# Patient Record
Sex: FEMALE | Race: OTHER | HISPANIC OR LATINO
[De-identification: names, ages, dates, MRNs, and addresses within clinical notes are randomized per-mention and may not be internally consistent; named-entity substitution may affect disease eponyms.]

---

## 2023-09-21 ENCOUNTER — APPOINTMENT (OUTPATIENT)
Dept: OTOLARYNGOLOGY | Facility: CLINIC | Age: 53
End: 2023-09-21
Payer: MEDICAID

## 2023-09-21 VITALS — BODY MASS INDEX: 26.84 KG/M2 | HEIGHT: 66 IN | WEIGHT: 167 LBS

## 2023-09-21 DIAGNOSIS — Z80.9 FAMILY HISTORY OF MALIGNANT NEOPLASM, UNSPECIFIED: ICD-10-CM

## 2023-09-21 DIAGNOSIS — Z86.39 PERSONAL HISTORY OF OTHER ENDOCRINE, NUTRITIONAL AND METABOLIC DISEASE: ICD-10-CM

## 2023-09-21 DIAGNOSIS — Z78.9 OTHER SPECIFIED HEALTH STATUS: ICD-10-CM

## 2023-09-21 DIAGNOSIS — Z82.3 FAMILY HISTORY OF STROKE: ICD-10-CM

## 2023-09-21 DIAGNOSIS — K21.9 GASTRO-ESOPHAGEAL REFLUX DISEASE W/OUT ESOPHAGITIS: ICD-10-CM

## 2023-09-21 PROBLEM — Z00.00 ENCOUNTER FOR PREVENTIVE HEALTH EXAMINATION: Status: ACTIVE | Noted: 2023-09-21

## 2023-09-21 PROCEDURE — 99203 OFFICE O/P NEW LOW 30 MIN: CPT | Mod: 25

## 2023-09-21 PROCEDURE — 31575 DIAGNOSTIC LARYNGOSCOPY: CPT

## 2023-10-12 ENCOUNTER — APPOINTMENT (OUTPATIENT)
Dept: OTOLARYNGOLOGY | Facility: CLINIC | Age: 53
End: 2023-10-12
Payer: MEDICAID

## 2023-10-12 VITALS — BODY MASS INDEX: 26.84 KG/M2 | WEIGHT: 167 LBS | HEIGHT: 66 IN

## 2023-10-12 PROCEDURE — 41110 EXCISION OF TONGUE LESION: CPT

## 2023-10-12 RX ORDER — UBIDECARENONE/VIT E ACET 100MG-5
CAPSULE ORAL
Refills: 0 | Status: ACTIVE | COMMUNITY

## 2023-10-12 RX ORDER — PNV NO.95/FERROUS FUM/FOLIC AC 28MG-0.8MG
TABLET ORAL
Refills: 0 | Status: ACTIVE | COMMUNITY

## 2023-10-12 RX ORDER — ELECTROLYTES/DEXTROSE
SOLUTION, ORAL ORAL
Refills: 0 | Status: ACTIVE | COMMUNITY

## 2023-10-12 RX ORDER — PAROXETINE HYDROCHLORIDE 20 MG/1
20 TABLET, FILM COATED ORAL
Refills: 0 | Status: ACTIVE | COMMUNITY

## 2023-10-12 RX ORDER — IRON POLYSACCHARIDE COMPLEX 150 MG
150 CAPSULE ORAL
Refills: 0 | Status: ACTIVE | COMMUNITY

## 2023-10-12 RX ORDER — QUETIAPINE FUMARATE 25 MG/1
25 TABLET ORAL
Refills: 0 | Status: ACTIVE | COMMUNITY

## 2023-10-17 ENCOUNTER — NON-APPOINTMENT (OUTPATIENT)
Age: 53
End: 2023-10-17

## 2023-10-17 LAB — CORE LAB BIOPSY: NORMAL

## 2023-11-07 ENCOUNTER — APPOINTMENT (OUTPATIENT)
Dept: OTOLARYNGOLOGY | Facility: CLINIC | Age: 53
End: 2023-11-07
Payer: MEDICAID

## 2023-11-07 DIAGNOSIS — K14.8 OTHER DISEASES OF TONGUE: ICD-10-CM

## 2023-11-07 PROCEDURE — 99212 OFFICE O/P EST SF 10 MIN: CPT | Mod: 25

## 2023-12-08 ENCOUNTER — APPOINTMENT (OUTPATIENT)
Dept: OTOLARYNGOLOGY | Facility: CLINIC | Age: 53
End: 2023-12-08
Payer: MEDICAID

## 2023-12-08 PROCEDURE — 99203 OFFICE O/P NEW LOW 30 MIN: CPT

## 2024-01-17 ENCOUNTER — APPOINTMENT (OUTPATIENT)
Dept: OTOLARYNGOLOGY | Facility: CLINIC | Age: 54
End: 2024-01-17

## 2024-01-24 ENCOUNTER — APPOINTMENT (OUTPATIENT)
Dept: OTOLARYNGOLOGY | Facility: CLINIC | Age: 54
End: 2024-01-24
Payer: MEDICAID

## 2024-01-24 VITALS
HEART RATE: 92 BPM | DIASTOLIC BLOOD PRESSURE: 59 MMHG | TEMPERATURE: 98 F | OXYGEN SATURATION: 98 % | BODY MASS INDEX: 28.66 KG/M2 | SYSTOLIC BLOOD PRESSURE: 99 MMHG | WEIGHT: 172 LBS | HEIGHT: 65 IN

## 2024-01-24 PROCEDURE — 11426 EXC H-F-NK-SP B9+MARG >4 CM: CPT

## 2024-01-31 ENCOUNTER — APPOINTMENT (OUTPATIENT)
Dept: OTOLARYNGOLOGY | Facility: CLINIC | Age: 54
End: 2024-01-31
Payer: MEDICAID

## 2024-01-31 VITALS
OXYGEN SATURATION: 74 % | SYSTOLIC BLOOD PRESSURE: 109 MMHG | TEMPERATURE: 98 F | WEIGHT: 172 LBS | HEART RATE: 64 BPM | HEIGHT: 65 IN | BODY MASS INDEX: 28.66 KG/M2 | DIASTOLIC BLOOD PRESSURE: 75 MMHG

## 2024-01-31 LAB — CORE LAB BIOPSY: NORMAL

## 2024-01-31 PROCEDURE — 99024 POSTOP FOLLOW-UP VISIT: CPT

## 2024-01-31 NOTE — ASSESSMENT
[FreeTextEntry1] : .  Plan: - Doing well. Wound care instructions reviewed. - RTC 1 month  -- Donna Goodman MD Facial Plastic & Reconstructive Surgery

## 2024-01-31 NOTE — REASON FOR VISIT
[de-identified] : Right neck lesion excision [de-identified] : 1/24/24 [de-identified] : Pt is doing well since surgery. Pain is well-controlled. No concerns.   Exam: No acute distress Incision well-healing, sutures removed No evidence of infection or dehiscence Mild ecchymosis along superior chest likely related to skin elevation   Path:  Compound melanocytic nevus

## 2024-02-28 ENCOUNTER — APPOINTMENT (OUTPATIENT)
Dept: OTOLARYNGOLOGY | Facility: CLINIC | Age: 54
End: 2024-02-28
Payer: MEDICAID

## 2024-02-28 VITALS
DIASTOLIC BLOOD PRESSURE: 70 MMHG | HEART RATE: 76 BPM | WEIGHT: 172 LBS | TEMPERATURE: 97.6 F | SYSTOLIC BLOOD PRESSURE: 102 MMHG | HEIGHT: 65 IN | OXYGEN SATURATION: 82 % | BODY MASS INDEX: 28.66 KG/M2

## 2024-02-28 DIAGNOSIS — L98.9 DISORDER OF THE SKIN AND SUBCUTANEOUS TISSUE, UNSPECIFIED: ICD-10-CM

## 2024-02-28 PROCEDURE — 99212 OFFICE O/P EST SF 10 MIN: CPT

## 2024-02-28 NOTE — HISTORY OF PRESENT ILLNESS
[de-identified] : Ms. ALKA MARQUES is a pleasant 53 year female who presented for right neck pigmented lesion. She is now s/p in-office excision carried out on  carried out on 1/24/24.    She is doing well since last office visit. No concerns. Pain is well-controlled. No itchiness around the incision. Pathology was reviewed.

## 2024-02-28 NOTE — ASSESSMENT
[FreeTextEntry1] : .  Plan: - Healing well. Recommend Biocorneum or other silicone cream for scar healing. - Pt interested in Juvederm/Botox. Will provide quote and she will schedule accordingly.  -- Donna Goodman MD Facial Plastic & Reconstructive Surgery

## 2024-02-28 NOTE — PHYSICAL EXAM
[de-identified] : well-approximated, well-healing linear incision along right inferior lateral neck w/ some residual erythema- expected. No evidence of dehiscence, widening, or scarring

## 2024-02-29 ENCOUNTER — APPOINTMENT (OUTPATIENT)
Dept: PHYSICAL MEDICINE AND REHAB | Facility: CLINIC | Age: 54
End: 2024-02-29
Payer: MEDICAID

## 2024-02-29 ENCOUNTER — OUTPATIENT (OUTPATIENT)
Dept: OUTPATIENT SERVICES | Facility: HOSPITAL | Age: 54
LOS: 1 days | End: 2024-02-29
Payer: MEDICAID

## 2024-02-29 ENCOUNTER — RESULT REVIEW (OUTPATIENT)
Age: 54
End: 2024-02-29

## 2024-02-29 VITALS
TEMPERATURE: 97.7 F | WEIGHT: 164 LBS | OXYGEN SATURATION: 97 % | HEIGHT: 65.5 IN | BODY MASS INDEX: 27 KG/M2 | SYSTOLIC BLOOD PRESSURE: 116 MMHG | HEART RATE: 78 BPM | DIASTOLIC BLOOD PRESSURE: 80 MMHG

## 2024-02-29 DIAGNOSIS — N64.89 OTHER SPECIFIED DISORDERS OF BREAST: ICD-10-CM

## 2024-02-29 DIAGNOSIS — Z85.9 PERSONAL HISTORY OF MALIGNANT NEOPLASM, UNSPECIFIED: ICD-10-CM

## 2024-02-29 DIAGNOSIS — Z87.39 PERSONAL HISTORY OF OTHER DISEASES OF THE MUSCULOSKELETAL SYSTEM AND CONNECTIVE TISSUE: ICD-10-CM

## 2024-02-29 DIAGNOSIS — S39.012A STRAIN OF MUSCLE, FASCIA AND TENDON OF LOWER BACK, INITIAL ENCOUNTER: ICD-10-CM

## 2024-02-29 DIAGNOSIS — Z56.0 UNEMPLOYMENT, UNSPECIFIED: ICD-10-CM

## 2024-02-29 DIAGNOSIS — M54.6 PAIN IN THORACIC SPINE: ICD-10-CM

## 2024-02-29 DIAGNOSIS — Z78.9 OTHER SPECIFIED HEALTH STATUS: ICD-10-CM

## 2024-02-29 DIAGNOSIS — G89.29 PAIN IN THORACIC SPINE: ICD-10-CM

## 2024-02-29 DIAGNOSIS — Z86.79 PERSONAL HISTORY OF OTHER DISEASES OF THE CIRCULATORY SYSTEM: ICD-10-CM

## 2024-02-29 DIAGNOSIS — Z86.39 PERSONAL HISTORY OF OTHER ENDOCRINE, NUTRITIONAL AND METABOLIC DISEASE: ICD-10-CM

## 2024-02-29 PROCEDURE — 72072 X-RAY EXAM THORAC SPINE 3VWS: CPT

## 2024-02-29 PROCEDURE — 72072 X-RAY EXAM THORAC SPINE 3VWS: CPT | Mod: 26

## 2024-02-29 PROCEDURE — 72052 X-RAY EXAM NECK SPINE 6/>VWS: CPT | Mod: 26

## 2024-02-29 PROCEDURE — 99204 OFFICE O/P NEW MOD 45 MIN: CPT

## 2024-02-29 PROCEDURE — 72052 X-RAY EXAM NECK SPINE 6/>VWS: CPT

## 2024-02-29 PROCEDURE — 72110 X-RAY EXAM L-2 SPINE 4/>VWS: CPT | Mod: 26

## 2024-02-29 PROCEDURE — 72110 X-RAY EXAM L-2 SPINE 4/>VWS: CPT

## 2024-02-29 RX ORDER — GLIPIZIDE 2.5 MG/1
TABLET ORAL
Refills: 0 | Status: ACTIVE | COMMUNITY

## 2024-02-29 RX ORDER — METOPROLOL TARTRATE 75 MG/1
TABLET, FILM COATED ORAL
Refills: 0 | Status: ACTIVE | COMMUNITY

## 2024-02-29 SDOH — ECONOMIC STABILITY - INCOME SECURITY: UNEMPLOYMENT, UNSPECIFIED: Z56.0

## 2024-03-11 NOTE — ASSESSMENT
[FreeTextEntry1] :                                                       Assessment/Plan:   ALKA MARQUES is a 53 year female with one year of persistent neck, back pain here for initial consultation.   Cervical myofascial pain syndrome Cervicalgia Thoracic back pain, B/L Decreased range of motion of cervical spine Scapular dykinesis  Pendulous breasts, B/L  Lumbar strain   - Tiers of treatment and management of above diagnosis(es) were discussed with patient - Optimal diet, weight, sleep, and lifestyle management to minimize stress and maximize well being counseling provided - Imaging reviewed and discussed with patient - Reviewed previous encounter notes from 2/4/2024 Dr. LUIS Leger (Orthopedic Surgery Spine Surgery) - Patient was advised to start a structured, targeted therapy program 2-3x/wk for 8 wks with goal toward HEP - Patient was educated on an appropriate home exercise program, provided with exercise recommendations, all questions answered, written HEP scanned into the chart - Patient may trial acetaminophen 1000mg up to TID PRN moderate to severe pain and to decrease average consumption of NSAIDs - Patient was advised to apply cool compresses or warm heat to affected regions PRN - Radiographs of cervical, thoracic, lumbar region ordered today    - Educated about red flag symptoms including (but not limited to) new, worsened, or persistent: fever greater than 100F, bowel or bladder incontinence, bowel obstipation, inability to void urine, urinary leakage, Severe nausea or vomiting, Worsening numbness, worsening tingling/paresthesias, and/or new or progressive motor weakness; advised to seek immediate medical attention at his nearest Emergency department should they experience any of the above     - Follow up in person in 2 months to assess their progress with PT course, review radiographs if no red flag findings, review pharmacologic treatments, review any interval specialist follow up    I have personally spent a total of at least 45 minutes preparing, reviewing internal and external records, explaining, counseling, providing necessary information via documented paperwork for this encounter, and coordinating care for this patient encounter.   Thank you, Dr(s), for allowing me to participate in the care of your patient. Please do not hesitate to contact me with questions/concerns.   Xavier Allen M.D. Sports and Interventional Spine Department of Physical Medicine and Rehabilitation [facility] Orthopaedic [facility] [address] [place] [address]   Appointments: [phone] Fax: [phone]

## 2024-03-11 NOTE — HISTORY OF PRESENT ILLNESS
[FreeTextEntry1] : Xavier Allen M.D. Sports Medicine and Interventional Spine Department of Physical Medicine and Rehabilitation [facility] Orthopaedic [facility] [address] [place] [address]   [facility] Orthopaedic [place] at [facility] [address] [place] [address]   For [place] Appointments Phone: [phone] Fax: [phone]   ----------------------------------------------------------------------------------------------------------------------------------------   PATIENT: ALKA MARQUES MRN: [id number] DATE OF BIRTH: [birth year]  3 1970 DATE OF SERVICE: 02/29/2024 Feb 29, 2024   Dear Drs.   Thank you for referring ALKA MARQUES to my Sports and Interventional Spine practice and office. Enclosed is a copy of the patient's consultation/progress note, which includes my complete assessment and recent studies completed during the patient's evaluation.   If you have questions or have any patients who require nonsurgical, non-opiate management of any sports, spine, or musculoskeletal conditions, please do not hesitate to contact my , Gem Damon at [phone].   I look forward to taking care of your patients along with you.   Sincerely,   Xavier Allen MD Sports, Interventional Spine, & Regenerative Musculoskeletal Medicine Orthopaedic [place] at [facility]                                                       Initial Consultation: CC: cervical pain, breast reduction   HPI:  This is the first visit to [facility] [place] at [facility] Sports Medicine and Interventional Spine Practice.   ALKA MARQUES presents with the chief complaint as above.   Initial Hx on 02/29/2024 : Presents in person to [facility] patient notes that they lost weight related to their gastric bypass surgery 9/2022 their weight loss affected the structure of their breast tissue and is complaining of severe neck pain and pendulous breast tissue, intermittent bacterial skin infections since the weight loss as well patient notes that they have persistent neck and thoracic back pain intermittent low back pain as well points to the right > left cervical region The patients difficulties began one year ago, without inciting event The pain is graded as mild to moderate The pain is described as variable The pain is intermittent The pain does not radiate The patient feels that the pain is overall persistent Patient denies other recent fall, MVA, injury, trauma, or accident besides presenting history above   Aggravating: ambulation, prolonged sitting, prolonged standing, navigating stairs, getting out of bed, sit to stand transitional movements Alleviating: rest, activity modification, pharmacologic treatments as per intake and as above   Meds: denies regular PO pain medications Therapy Program: no recent structured targeted therapy program HEP: doing HEP regularly Injection Hx: denies locally directed treatment to the area in question Imaging Hx: reviewed   Assoc Sx: Denies numbness, Tingling Denies Focal motor weakness in the upper or lower limbs Denies New or worsened bowel or bladder incontinence Denies Saddle anesthesia Denies Using Orthotic(s)/Supportive devices Denies Swelling in the upper/lower extremities They also deny frequent tripping, falling   ROS: A 14 point review of systems was completed. Positive findings are pain as described above. The remaining systems negative.   Breast Cancer Surveillance: up to date COVID HX: reviewed   Assoc Hx: Ambulates without assistive device Level of functioning: indep with ambulation, indep with ADLs Living Situation: dwelling with steps to enter

## 2024-03-11 NOTE — PHYSICAL EXAM
[FreeTextEntry1] : Gen: A+O x 3 in NAD Psych: Normal mood and affect. Responds appropriately to commands Eyes: Anicteric. No discharge. EOMI. Resp: Breathing unlabored CV: radial pulses 2+ and equal. No varicosities noted Ext: No c/c/e Skin: No lesions noted   Gait: Non antalgic +  reciprocating heel to toe able to stand on toes and heels WITH hand holding/holding onto counter with both hands Tandem gait intact WITH hand holding Poor single leg standing balance Able to stand on either lower limb without LOB for 15 seconds Romberg negative   CN2-12 grossly intact  Inspection: Spine alignment is midline, with no evidence of scoliosis. Iliac crest heights and PSIS heights level.  + Forward head position.   Palpation: There is + tenderness over the upper traps, middle traps, levator scaps, rhomboids, articular pillars, cervical paraspinals, B/L   Cervical ROM: Flexion, extension, side-bending, rotation, limited due to pain with most pain at terminal ROM Finger to nose bilaterally intact    C5 (Shoulder Abduction)        C5 (Elbow Flex)         C6 (Wrist Ext)  Right 5/5                                 5/5                           5/5       Left 5/5                                 5/5                           5/5             C7 (Elbow Ext)            C7 (Wrist Flex)             C8 (Long Finger Flex)          T1 (Finger Abduct)          Right 5/5                     5/5                                      5/5                                       5/5                                                 Left 5/5                     5/5                                      5/5                                       5/5                                  C8 (Thumb Ext)            C8 & T1 (Thumb Opp)           Right 5/5                            5/5                                                 Left 5/5                            5/5                                Tone: Normal. No cog wheeling. Proprioception at DIPs intact B/L Sensation: Grossly intact to light touch and pinprick bilateral upper extremities. Reflexes: 2+ symmetric biceps, pronators, brachioradialis, triceps Hoffmans Sign negative Spurling's Sign negative Shoulder Abduction Test (Bakody's) absent Lhermittes Sign negative  RIGHT SHOULDER: neg effusion neg scars or lacerations or lesions neg increased warmth neg scapular winging + scapular dyskinesis (Type 2) neg muscle atrophy   Palpation: no TTP over sterna-clavicular joint no TTP over clavicle no TTP over coracoid process no TTP over sternum no TTP over AC joint no TTP over humerus +TTP over the spine of the scapula + TTP over the medial border of the scapula, superior angle, inferior angle, lateral border no TTP over supraspinatus +TTP over infraspinatus no TTP over upper trapezius no TTP over deltoid muscle no TTP in the axilla neg crepitus with PROM shoulder   AROM: Active range of motion was near functional limits but with pain at terminal ROM in most planes PROM consistent with above   Assessment of access (for OVERHEAD/INCLINE PRESS) on the right demonstrates inability to forward flex, externally rotate shoulder beyond 130 degrees without compensatory: + thoracic extension + thoracic rotation of the contralateral side towards the ipsilateral shoulder + postural kyphosis     neg sulcus sign neg Neer test neg Venegas test neg Yocums test neg Speeds test neg Yergasons test neg drop arm test + empty can test neg Houston's Test neg external rotation lag sign neg lift off sign neg belly press neg hornblowers sign neg Horizontal adduction test   Sensation to light touch intact over all dermatomes about the shoulder   Distal pulses present     LEFT SHOULDER neg effusion neg scars or lacerations or lesions neg increased warmth neg scapular winging + scapular dyskinesis (Type 2) neg muscle atroph   Palpation: no TTP over sterna-clavicular joint no TTP over clavicle no TTP over coracoid process no TTP over sternum no TTP over AC joint + TTP over humerus + TTP over the spine of the scapula no TTP over the medial border of the scapula, superior angle, inferior angle, lateral border + TTP over supraspinatus + TTP over infraspinatus +TTP over upper trapezius no TTP over deltoid muscle no TTP in the axilla neg crepitus with PROM shoulder   AROM: Active range of motion was near functional limits but with pain at terminal ROM in most planes PROM consistent with above   Assessment of access (for OVERHEAD/INCLINE PRESS) on the LEFT demonstrates inability to forward flex, externally rotate shoulder beyond 130 degrees without compensatory: + thoracic extension + thoracic rotation of the contralateral side towards the ipsilateral shoulder + postural kyphosis   neg sulcus sign neg Neer test neg Venegas test neg Yocums test neg Speeds test neg Yergasons test neg drop arm test + empty can test neg Houston's Test neg external rotation lag sign neg lift off sign neg belly press neg hornblowers sign neg Horizontal adduction test   Sensation to light touch intact over all dermatomes about the shoulder Distal pulses present   Manual Muscle Testing  as above including    Resisted Internal Rotation Right 4-/5 Left 4/5   Resisted External Rotation Right 4-/5 Left 4/5   Prone Resisted Shoulder Extension Right 4-/5 Left 4-/5

## 2024-05-09 ENCOUNTER — APPOINTMENT (OUTPATIENT)
Dept: PHYSICAL MEDICINE AND REHAB | Facility: CLINIC | Age: 54
End: 2024-05-09
Payer: COMMERCIAL

## 2024-05-09 VITALS
TEMPERATURE: 97.5 F | SYSTOLIC BLOOD PRESSURE: 106 MMHG | DIASTOLIC BLOOD PRESSURE: 70 MMHG | WEIGHT: 164 LBS | HEIGHT: 65.5 IN | BODY MASS INDEX: 27 KG/M2 | HEART RATE: 70 BPM | OXYGEN SATURATION: 97 %

## 2024-05-09 DIAGNOSIS — M54.12 RADICULOPATHY, CERVICAL REGION: ICD-10-CM

## 2024-05-09 DIAGNOSIS — M48.10 ANKYLOSING HYPEROSTOSIS [FORESTIER], SITE UNSPECIFIED: ICD-10-CM

## 2024-05-09 DIAGNOSIS — M79.18 MYALGIA, OTHER SITE: ICD-10-CM

## 2024-05-09 DIAGNOSIS — M54.2 CERVICALGIA: ICD-10-CM

## 2024-05-09 DIAGNOSIS — M53.82 OTHER SPECIFIED DORSOPATHIES, CERVICAL REGION: ICD-10-CM

## 2024-05-09 DIAGNOSIS — G25.89 OTHER SPECIFIED EXTRAPYRAMIDAL AND MOVEMENT DISORDERS: ICD-10-CM

## 2024-05-09 PROCEDURE — 99214 OFFICE O/P EST MOD 30 MIN: CPT

## 2024-05-09 PROCEDURE — G2211 COMPLEX E/M VISIT ADD ON: CPT | Mod: NC,1L

## 2024-05-09 RX ORDER — GABAPENTIN 100 MG/1
100 CAPSULE ORAL
Qty: 90 | Refills: 1 | Status: ACTIVE | COMMUNITY
Start: 2024-05-09 | End: 1900-01-01

## 2024-06-23 PROBLEM — M54.2 CERVICALGIA: Status: ACTIVE | Noted: 2024-02-29

## 2024-06-23 PROBLEM — G25.89 SCAPULAR DYSKINESIS: Status: ACTIVE | Noted: 2024-02-29

## 2024-06-23 PROBLEM — M53.82 DECREASED RANGE OF MOTION OF INTERVERTEBRAL DISCS OF CERVICAL SPINE: Status: ACTIVE | Noted: 2024-02-29

## 2024-06-23 PROBLEM — M79.18 CERVICAL MYOFASCIAL PAIN SYNDROME: Status: ACTIVE | Noted: 2024-02-29

## 2024-06-23 NOTE — PHYSICAL EXAM
[FreeTextEntry1] : Gen: A+O x 3 in NAD Psych: Normal mood and affect. Responds appropriately to commands Eyes: Anicteric. No discharge. EOMI. Resp: Breathing unlabored CV: radial pulses 2+ and equal. No varicosities noted Ext: No c/c/e Skin: No lesions noted   Gait: Non antalgic +  reciprocating heel to toe able to stand on toes and heels WITH hand holding/holding onto counter with both hands Tandem gait intact WITH hand holding Fair single leg standing balance Romberg negative   CN2-12 grossly intact  Inspection: Spine alignment is midline, with no evidence of scoliosis. Iliac crest heights and PSIS heights level.  + Forward head position.   Palpation: There is + tenderness over the upper traps, middle traps, levator scaps, rhomboids, articular pillars, cervical paraspinals, B/L   Cervical ROM: Flexion, extension, side-bending, rotation, limited due to pain with most pain at terminal ROM Finger to nose bilaterally intact    C5 (Shoulder Abduction)        C5 (Elbow Flex)         C6 (Wrist Ext)  Right 4/5                                 4/5                           3+/5       Left 5/5                                 5/5                           5/5             C7 (Elbow Ext)            C7 (Wrist Flex)             C8 (Long Finger Flex)          T1 (Finger Abduct)          Right 4/5                     5/5                                      5/5                                       5/5                                                 Left 5/5                     5/5                                      5/5                                       5/5                                  C8 (Thumb Ext)            C8 & T1 (Thumb Opp)           Right 5/5                            5/5                                                 Left 4/5                            4/5                                Tone: Normal. No cog wheeling. Proprioception at DIPs intact B/L Sensation: Grossly intact to light touch and pinprick bilateral upper extremities. Reflexes: 2+ symmetric biceps, pronators, brachioradialis, triceps Hoffmans Sign negative Spurling's Sign negative Shoulder Abduction Test (Bakody's) absent Lhermittes Sign negative  RIGHT SHOULDER: neg effusion neg scars or lacerations or lesions neg increased warmth neg scapular winging + scapular dyskinesis (Type 2) neg muscle atrophy   Palpation: no TTP over sterna-clavicular joint no TTP over clavicle no TTP over coracoid process no TTP over sternum no TTP over AC joint no TTP over humerus +TTP over the spine of the scapula + TTP over the medial border of the scapula, superior angle, inferior angle, lateral border no TTP over supraspinatus +TTP over infraspinatus no TTP over upper trapezius no TTP over deltoid muscle no TTP in the axilla neg crepitus with PROM shoulder   AROM: Active range of motion was near functional limits but with pain at terminal ROM in most planes PROM consistent with above   Assessment of access (for OVERHEAD/INCLINE PRESS) on the right demonstrates inability to forward flex, externally rotate shoulder beyond 130 degrees without compensatory: + thoracic extension + thoracic rotation of the contralateral side towards the ipsilateral shoulder + postural kyphosis    neg sulcus sign neg Neer test neg Venegas test neg Yocums test neg Speeds test neg Yergasons test neg drop arm test + empty can test neg Dane's Test neg external rotation lag sign neg lift off sign neg belly press neg hornblowers sign neg Horizontal adduction test   Sensation to light touch intact over all dermatomes about the shoulder   Distal pulses present    LEFT SHOULDER neg effusion neg scars or lacerations or lesions neg increased warmth neg scapular winging + scapular dyskinesis (Type 2) neg muscle atroph   Palpation: no TTP over sterna-clavicular joint no TTP over clavicle no TTP over coracoid process no TTP over sternum no TTP over AC joint + TTP over humerus + TTP over the spine of the scapula no TTP over the medial border of the scapula, superior angle, inferior angle, lateral border + TTP over supraspinatus + TTP over infraspinatus +TTP over upper trapezius no TTP over deltoid muscle no TTP in the axilla neg crepitus with PROM shoulder   AROM: Active range of motion was near functional limits but with pain at terminal ROM in most planes PROM consistent with above   Assessment of access (for OVERHEAD/INCLINE PRESS) on the LEFT demonstrates inability to forward flex, externally rotate shoulder beyond 130 degrees without compensatory: + thoracic extension + thoracic rotation of the contralateral side towards the ipsilateral shoulder + postural kyphosis   neg sulcus sign neg Neer test neg Venegas test neg Yocums test neg Speeds test neg Yergasons test neg drop arm test + empty can test neg Dane's Test neg external rotation lag sign neg lift off sign neg belly press neg hornblowers sign neg Horizontal adduction test   Sensation to light touch intact over all dermatomes about the shoulder Distal pulses present   Manual Muscle Testing  as above including    Resisted Internal Rotation Right 4-/5 Left 4/5   Resisted External Rotation Right 4-/5 Left 4/5   Prone Resisted Shoulder Extension Right 4-/5 Left 4-/5

## 2024-06-23 NOTE — HISTORY OF PRESENT ILLNESS
[FreeTextEntry1] : Xavier Allen M.D. Sports Medicine and Interventional Spine Department of Physical Medicine and Rehabilitation [facility] Orthopaedic [facility] [address], [address] [place] [address]   [facility] Orthopaedic [place] at [facility] [address], [address] [place] [address]   For [place] Appointments Phone: [phone] Fax: [phone]   ----------------------------------------------------------------------------------------------------------------------------------------   PATIENT: ALKA MARQUES MRN: [id number] DATE OF BIRTH: Vincent  3 1970 DATE OF SERVICE: May 09, 2024    May 09, 2024    Dear Drs.   Thank you for referring ALKA MARQUES to my Sports and Interventional Spine practice and office. Enclosed is a copy of the patient's consultation/progress note, which includes my complete assessment and recent studies completed during the patient's evaluation.   If you have questions or have any patients who require nonsurgical, non-opiate management of any sports, spine, or musculoskeletal conditions, please do not hesitate to contact my , Gem Damon at [phone].   I look forward to taking care of your patients along with you.   Sincerely,   Xavier Allen MD Sports, Interventional Spine, & Regenerative Musculoskeletal Medicine Orthopaedic [facility]                                                       Follow Up Visit CC: cervical pain, breast reduction   HPI:  This is a follow up visit to [facility] Orthopaedic [place] at [facility] Sports Medicine and Interventional Spine Practice.   ALKA MARQUES presents with the chief complaint as above.   Interval Hx on May 09, 2024: presents for follow up. Since last visit, we advised them to start physical therapy program, trail Tylenol as needed and to obtain radiographs of the cervical/thoracic region. patient has received PT that ended due to insurance issues. patient received several sessions of therapy program about three times per week for 4-5 weeks. Patient reports increasing frequency of severe pain as well as increasingly severe intensity of pain over the cervical region. pain has 8/10 pain over the cervical region. Patient reports having difficulty with ambulation indoors due to pain and stiffness from the cervical region. patient has been applying topical over the counter remedies with some relief. Patient reports difficulty with everyday activities including reaching down/bending forward for lower body dressing, toileting, hygiene, and transferring about surfaces within her home for sitting, resting. There have been no significant changes to their aggravating or alleviating factors since the last visit. Since the last visit, they relate MARGINAL improvements in pain previously associated with known exacerbating, aggravating factors and situations. Given dearth of symptoms, pharmacologic treatments are now minimal. Denies new or worsened numbness, tingling, or focal motor deficit. Denies interval fall, accident, or injury. Denies change in bowel or bladder functioning. from 3/2024 through end of April 2024 patient received structured therapy program treatments with [facility]. as part of her home exercises, patient routinely does ROM BUE, cervical ROM, chin tucks, lateral recumbent ROM, Germaine Tejeda, DPT. patient received treatment for pain to her right shoulder. patient states that due to insurance loss she is currently no longer pursuing breast reduction surgery.   Assoc Sx: Denies numbness, Tingling Denies Focal motor weakness in the upper or lower limbs Denies New or worsened bowel or bladder incontinence Denies Saddle anesthesia Denies Using Orthotic(s)/Supportive devices Denies Swelling in the upper/lower extremities They also deny frequent tripping, falling   ROS: A 14 point review of systems was completed. Positive findings are pain as described above. The remaining systems negative.   Breast Cancer Surveillance: up to date COVID HX: reviewed   Initial Hx on 02/29/2024: Presents in person to [facility]. patient notes that they lost weight related to their gastric bypass surgery 9/2022. their weight loss affected the structure of their breast tissue and is complaining of severe neck pain and pendulous breast tissue, intermittent bacterial skin infections since the weight loss as well. patient notes that they have persistent neck and thoracic back pain intermittent low back pain as well. points to the right > left cervical region.The patients difficulties began one year ago, without inciting event. The pain is graded as mild to moderate. The pain is described as variable. The pain is intermittent. The pain does not radiate. The patient feels that the pain is overall persistent. Patient denies other recent fall, MVA, injury, trauma, or accident besides presenting history above. Aggravating: ambulation, prolonged sitting, prolonged standing, navigating stairs, getting out of bed, sit to stand transitional movements. Alleviating: rest, activity modification, pharmacologic treatments as per intake and as above.  Meds: denies regular PO pain medications. Therapy Program: no recent structured targeted therapy program. HEP: doing HEP regularly. Injection Hx: denies locally directed treatment to the area in question. Imaging Hx: reviewed.   Assoc Hx: Ambulates without assistive device Level of functioning: indep with ambulation, indep with ADLs Living Situation: dwelling with steps to enter

## 2024-06-23 NOTE — ASSESSMENT
[FreeTextEntry1] :                                                       Assessment/Plan:   ALKA MARQUES is a 53 year female with one year of persistent neck, back pain here for follow up   Chronic cervical radiculopathy, C5/C6, right > left (dominant)  Cervical myofascial pain syndrome Cervicalgia Thoracic back pain, B/L Decreased range of motion of cervical spine Scapular dykinesis  Pendulous breast, B/L  Lumbar strain   - Tiers of treatment and management of above diagnosis(es) were discussed with patient - Optimal diet, weight, sleep, and lifestyle management to minimize stress and maximize well being counseling provided - Imaging reviewed and discussed with patient - Reviewed previous encounter notes from 2/4/2024 Dr. LUIS Leger (Orthopedic Surgery Spine Surgery) - Patient was advised to continue a structured, targeted therapy program 2-3x/wk for 8 wks with goal toward HEP - Patient was educated on an appropriate home exercise program, provided with exercise recommendations, all questions answered, written HEP scanned into the chart - May 09, 2024: patient was advised to start gabapentin for their neuropathic pain symptoms. Patient was instructed to begin with 100mg at bedtime for the next week. If well tolerated, patient will double their nightly dose to 200mg at bedtime. After another week, if the medication is well tolerated, they will commence 300mg at bedtime. Patient provided with written instructions as well. All questions were answered and the patient displayed a clear understanding of the plan of care, including titration of gabapentin. The patient was informed about not taking this medication at the same time as any sleeping or muscle relaxant pills. Pt was also notified to stop, and contact our office, if it is too sedating, ankle swelling occurs and/or they experience suicidal thinking. - Patient may trial acetaminophen 1000mg up to TID PRN moderate to severe pain and to decrease average consumption of NSAIDs - Patient was advised to apply cool compresses or warm heat to affected regions PRN - Radiographs of cervical, thoracic, lumbar region ordered and reviewed 2/2024    - Educated about red flag symptoms including (but not limited to) new, worsened, or persistent: fever greater than 100F, bowel or bladder incontinence, bowel obstipation, inability to void urine, urinary leakage, Severe nausea or vomiting, Worsening numbness, worsening tingling/paresthesias, and/or new or progressive motor weakness; advised to seek immediate medical attention at his nearest Emergency department should they experience any of the above  - 05/09/2024:  MRI cervical spine without contrast is indicated given that the pt has not improved with tylenol, ibuprofen, naproxen, meloxicam, they obtained non-diagnostic radiographs 2/29/2024, right C6 symptoms, weakness in dominant upper limb, necessary for local treatments and physical therapy/home exercise program>6 weeks. Patient's imaging is medically necessary to outline targets for locally (interventional) directed treatments and/or guide surgical management.  - Follow up in person in 2 months to assess their progress with PT course, review radiographs if no red flag findings, review pharmacologic treatments, review any interval specialist follow up    I have personally spent a total of at least 45 minutes preparing, reviewing internal and external records, explaining, counseling, providing necessary information via documented paperwork for this encounter, and coordinating care for this patient encounter.   Thank you, (s), for allowing me to participate in the care of your patient. Please do not hesitate to contact me with questions/concerns.   Xavier Allen M.D. Sports and Interventional Spine Department of Physical Medicine and Rehabilitation [facility] [place] [facility] [address] [place] [address]   Appointments: [phone] Fax: [phone]

## 2024-12-05 NOTE — PROCEDURE
Anesthesia Post Evaluation    Patient: Yaneli Keller    Procedure(s) Performed: Procedure(s) (LRB):  HYSTERECTOMY, TOTAL, VAGINAL, WITH CYSTOSCOPY (N/A)    Final Anesthesia Type: general      Patient location during evaluation: PACU  Patient participation: Yes- Able to Participate  Level of consciousness: awake and alert and oriented  Post-procedure vital signs: reviewed and stable  Pain management: adequate  Airway patency: patent  MILY mitigation strategies: Verification of full reversal of neuromuscular block  PONV status at discharge: No PONV  Anesthetic complications: no      Cardiovascular status: blood pressure returned to baseline and stable  Respiratory status: spontaneous ventilation and unassisted  Hydration status: euvolemic  Follow-up not needed.  Comments: [facility]              Vitals Value Taken Time   /76 12/05/24 0943   Temp 36.1 °C (96.9 °F) 12/05/24 0943   Pulse 64 12/05/24 0943   Resp 16 12/05/24 0943   SpO2 98 % 12/05/24 0943         Event Time   Out of Recovery 09:34:46         Pain/Vale Score: Vale Score: 10 (12/5/2024  9:33 AM)           [FreeTextEntry3] : Excision of Skin Lesion Indications: 1. Pigmented skin lesion of right inferior neck, 1.5x4cm Anesthesia: 2% lidocaine w/ 1:100,000 epinephrine w/ bicarbonate Consent: Risks, benefits, and alternatives were discussed with the patient and informed consent was obtained.  Details: The pt was placed supine in the procedure room. An elliptical excision with fusiform edges was marked around the visible lesion and oriented parallel to the natural neck creases. Local anesthetic was infiltrated, 7.5mL. The area was then prepped and draped in the usual sterile fashion. After adequate anesthesia, a 15-blade was used to incise along the premarked incision through the epidermis and dermis. Sharp dissection was carried out to elevate the lesion in a subdermal plane. Once fully removed, the lesion was oriented and sent for permanent histopathological analysis. Electrocautery was used to attain hemostasis. Elevation was carried out for a brief distance inferiorly and superiorly for closure. The incision was then closed in layered fashion using 4-0 Vicryl and 5-0 Moncoryl for the deep dermis and 5-0 Prolene for the epidermis.  Disposition: The patient tolerated the procedure without issue. Wound care instructions were provided. RTC 1 week for suture removal.  -- Donna Goodman MD Facial Plastic & Reconstructive Surgery

## 2024-12-13 ENCOUNTER — RESULT REVIEW (OUTPATIENT)
Age: 54
End: 2024-12-13

## 2024-12-13 ENCOUNTER — OUTPATIENT (OUTPATIENT)
Dept: OUTPATIENT SERVICES | Facility: HOSPITAL | Age: 54
LOS: 1 days | End: 2024-12-13
Payer: COMMERCIAL

## 2024-12-13 ENCOUNTER — APPOINTMENT (OUTPATIENT)
Dept: PHYSICAL MEDICINE AND REHAB | Facility: CLINIC | Age: 54
End: 2024-12-13
Payer: COMMERCIAL

## 2024-12-13 VITALS
SYSTOLIC BLOOD PRESSURE: 108 MMHG | DIASTOLIC BLOOD PRESSURE: 72 MMHG | HEART RATE: 72 BPM | HEIGHT: 65.5 IN | WEIGHT: 164 LBS | TEMPERATURE: 98.2 F | BODY MASS INDEX: 27 KG/M2 | OXYGEN SATURATION: 98 %

## 2024-12-13 DIAGNOSIS — G63 METABOLIC DISORDER, UNSPECIFIED: ICD-10-CM

## 2024-12-13 DIAGNOSIS — M54.16 RADICULOPATHY, LUMBAR REGION: ICD-10-CM

## 2024-12-13 DIAGNOSIS — G89.29 PAIN IN RIGHT KNEE: ICD-10-CM

## 2024-12-13 DIAGNOSIS — M25.561 PAIN IN RIGHT KNEE: ICD-10-CM

## 2024-12-13 DIAGNOSIS — M79.89 OTHER SPECIFIED SOFT TISSUE DISORDERS: ICD-10-CM

## 2024-12-13 DIAGNOSIS — M67.911 UNSPECIFIED DISORDER OF SYNOVIUM AND TENDON, RIGHT SHOULDER: ICD-10-CM

## 2024-12-13 DIAGNOSIS — G25.89 OTHER SPECIFIED EXTRAPYRAMIDAL AND MOVEMENT DISORDERS: ICD-10-CM

## 2024-12-13 DIAGNOSIS — M79.18 MYALGIA, OTHER SITE: ICD-10-CM

## 2024-12-13 DIAGNOSIS — M25.562 PAIN IN RIGHT KNEE: ICD-10-CM

## 2024-12-13 DIAGNOSIS — G73.7 METABOLIC DISORDER, UNSPECIFIED: ICD-10-CM

## 2024-12-13 DIAGNOSIS — M54.12 RADICULOPATHY, CERVICAL REGION: ICD-10-CM

## 2024-12-13 DIAGNOSIS — E88.9 METABOLIC DISORDER, UNSPECIFIED: ICD-10-CM

## 2024-12-13 DIAGNOSIS — M24.111 OTHER ARTICULAR CARTILAGE DISORDERS, RIGHT SHOULDER: ICD-10-CM

## 2024-12-13 PROCEDURE — G2211 COMPLEX E/M VISIT ADD ON: CPT

## 2024-12-13 PROCEDURE — 73564 X-RAY EXAM KNEE 4 OR MORE: CPT | Mod: 26,LT,RT

## 2024-12-13 PROCEDURE — 73564 X-RAY EXAM KNEE 4 OR MORE: CPT

## 2024-12-13 PROCEDURE — 73030 X-RAY EXAM OF SHOULDER: CPT | Mod: 26,LT,RT

## 2024-12-13 PROCEDURE — 73030 X-RAY EXAM OF SHOULDER: CPT

## 2024-12-13 PROCEDURE — 99215 OFFICE O/P EST HI 40 MIN: CPT

## 2024-12-18 DIAGNOSIS — R76.8 OTHER SPECIFIED ABNORMAL IMMUNOLOGICAL FINDINGS IN SERUM: ICD-10-CM

## 2024-12-18 DIAGNOSIS — Z98.84 BARIATRIC SURGERY STATUS: ICD-10-CM

## 2024-12-18 DIAGNOSIS — R52 PAIN, UNSPECIFIED: ICD-10-CM

## 2024-12-18 LAB
ALBUMIN SERPL ELPH-MCNC: 4.7 G/DL
ALP BLD-CCNC: 95 U/L
ALT SERPL-CCNC: 36 U/L
ANA PAT FLD IF-IMP: ABNORMAL
ANA PATTERN: ABNORMAL
ANA SER IF-ACNC: ABNORMAL
ANA TITER: ABNORMAL
ANION GAP SERPL CALC-SCNC: 13 MMOL/L
AST SERPL-CCNC: 35 U/L
BASOPHILS # BLD AUTO: 0.1 K/UL
BASOPHILS NFR BLD AUTO: 1.7 %
BILIRUB SERPL-MCNC: 0.3 MG/DL
BUN SERPL-MCNC: 12 MG/DL
CALCIUM SERPL-MCNC: 9.9 MG/DL
CCP AB SER IA-ACNC: <8 U/ML
CHLORIDE SERPL-SCNC: 104 MMOL/L
CK SERPL-CCNC: 120 U/L
CO2 SERPL-SCNC: 24 MMOL/L
CREAT SERPL-MCNC: 0.85 MG/DL
CRP SERPL-MCNC: <3 MG/L
CYSTATIN C SERPL-MCNC: 0.72 MG/L
EGFR: 81 ML/MIN/1.73M2
EOSINOPHIL # BLD AUTO: 0.13 K/UL
EOSINOPHIL NFR BLD AUTO: 2.2 %
ERYTHROCYTE [SEDIMENTATION RATE] IN BLOOD BY WESTERGREN METHOD: 10 MM/HR
ESTIMATED AVERAGE GLUCOSE: 94 MG/DL
FOLATE SERPL-MCNC: 16.7 NG/ML
GFR/BSA.PRED SERPLBLD CYS-BASED-ARV: 105 ML/MIN/1.73M2
GGT SERPL-CCNC: 7 U/L
GLUCOSE SERPL-MCNC: 84 MG/DL
HBA1C MFR BLD HPLC: 4.9 %
HCT VFR BLD CALC: 41.3 %
HGB BLD-MCNC: 13.4 G/DL
IMM GRANULOCYTES NFR BLD AUTO: 0.2 %
LYMPHOCYTES # BLD AUTO: 2.14 K/UL
LYMPHOCYTES NFR BLD AUTO: 37 %
MAN DIFF?: NORMAL
MCHC RBC-ENTMCNC: 28.9 PG
MCHC RBC-ENTMCNC: 32.4 G/DL
MCV RBC AUTO: 89.2 FL
MONOCYTES # BLD AUTO: 0.39 K/UL
MONOCYTES NFR BLD AUTO: 6.7 %
NEUTROPHILS # BLD AUTO: 3.02 K/UL
NEUTROPHILS NFR BLD AUTO: 52.2 %
PLATELET # BLD AUTO: 307 K/UL
POTASSIUM SERPL-SCNC: 5.4 MMOL/L
PROT SERPL-MCNC: 7.4 G/DL
RBC # BLD: 4.63 M/UL
RBC # FLD: 13.2 %
RF+CCP IGG SER-IMP: NEGATIVE
RHEUMATOID FACT SER QL: <10 IU/ML
SODIUM SERPL-SCNC: 141 MMOL/L
TSH SERPL-ACNC: 2.16 UIU/ML
URATE SERPL-MCNC: 3.4 MG/DL
VIT B12 SERPL-MCNC: 1054 PG/ML
WBC # FLD AUTO: 5.79 K/UL

## 2024-12-19 LAB
HOMOCYSTEINE LEVEL: 7.8 UMOL/L
METHYLMALONATE SERPL-SCNC: 152 NMOL/L

## 2025-01-09 ENCOUNTER — APPOINTMENT (OUTPATIENT)
Dept: PHYSICAL MEDICINE AND REHAB | Facility: CLINIC | Age: 55
End: 2025-01-09
Payer: COMMERCIAL

## 2025-01-09 DIAGNOSIS — M19.011 PRIMARY OSTEOARTHRITIS, RIGHT SHOULDER: ICD-10-CM

## 2025-01-09 DIAGNOSIS — R76.8 OTHER SPECIFIED ABNORMAL IMMUNOLOGICAL FINDINGS IN SERUM: ICD-10-CM

## 2025-01-09 DIAGNOSIS — M24.111 OTHER ARTICULAR CARTILAGE DISORDERS, RIGHT SHOULDER: ICD-10-CM

## 2025-01-09 DIAGNOSIS — M75.41 IMPINGEMENT SYNDROME OF RIGHT SHOULDER: ICD-10-CM

## 2025-01-09 PROCEDURE — G2211 COMPLEX E/M VISIT ADD ON: CPT

## 2025-01-09 PROCEDURE — 99214 OFFICE O/P EST MOD 30 MIN: CPT | Mod: 95

## 2025-01-30 ENCOUNTER — OUTPATIENT (OUTPATIENT)
Dept: OUTPATIENT SERVICES | Facility: HOSPITAL | Age: 55
LOS: 1 days | End: 2025-01-30
Payer: COMMERCIAL

## 2025-01-30 ENCOUNTER — APPOINTMENT (OUTPATIENT)
Dept: ULTRASOUND IMAGING | Facility: HOSPITAL | Age: 55
End: 2025-01-30
Payer: COMMERCIAL

## 2025-01-30 ENCOUNTER — APPOINTMENT (OUTPATIENT)
Dept: MRI IMAGING | Facility: HOSPITAL | Age: 55
End: 2025-01-30
Payer: COMMERCIAL

## 2025-01-30 PROCEDURE — 93970 EXTREMITY STUDY: CPT

## 2025-01-30 PROCEDURE — 93970 EXTREMITY STUDY: CPT | Mod: 26

## 2025-01-30 PROCEDURE — 73221 MRI JOINT UPR EXTREM W/O DYE: CPT | Mod: 26,RT

## 2025-01-30 PROCEDURE — 73221 MRI JOINT UPR EXTREM W/O DYE: CPT

## 2025-02-07 DIAGNOSIS — M77.8 OTHER ENTHESOPATHIES, NOT ELSEWHERE CLASSIFIED: ICD-10-CM

## 2025-02-07 DIAGNOSIS — M79.89 OTHER SPECIFIED SOFT TISSUE DISORDERS: ICD-10-CM

## 2025-02-07 DIAGNOSIS — M25.711 OSTEOPHYTE, RIGHT SHOULDER: ICD-10-CM

## 2025-02-07 DIAGNOSIS — M19.011 PRIMARY OSTEOARTHRITIS, RIGHT SHOULDER: ICD-10-CM

## 2025-02-07 DIAGNOSIS — M75.51 BURSITIS OF RIGHT SHOULDER: ICD-10-CM

## 2025-02-07 DIAGNOSIS — M67.813 OTHER SPECIFIED DISORDERS OF TENDON, RIGHT SHOULDER: ICD-10-CM

## 2025-02-13 ENCOUNTER — APPOINTMENT (OUTPATIENT)
Dept: PHYSICAL MEDICINE AND REHAB | Facility: CLINIC | Age: 55
End: 2025-02-13
Payer: COMMERCIAL

## 2025-02-13 VITALS
BODY MASS INDEX: 27 KG/M2 | HEIGHT: 65.5 IN | WEIGHT: 164 LBS | SYSTOLIC BLOOD PRESSURE: 112 MMHG | HEART RATE: 75 BPM | DIASTOLIC BLOOD PRESSURE: 78 MMHG | OXYGEN SATURATION: 95 % | TEMPERATURE: 97.7 F

## 2025-02-13 VITALS — DIASTOLIC BLOOD PRESSURE: 70 MMHG | HEART RATE: 67 BPM | SYSTOLIC BLOOD PRESSURE: 102 MMHG | OXYGEN SATURATION: 97 %

## 2025-02-13 DIAGNOSIS — M19.011 PRIMARY OSTEOARTHRITIS, RIGHT SHOULDER: ICD-10-CM

## 2025-02-13 DIAGNOSIS — M75.51 BURSITIS OF RIGHT SHOULDER: ICD-10-CM

## 2025-02-13 DIAGNOSIS — M67.813 OTHER SPECIFIED DISORDERS OF TENDON, RIGHT SHOULDER: ICD-10-CM

## 2025-02-13 DIAGNOSIS — R76.8 OTHER SPECIFIED ABNORMAL IMMUNOLOGICAL FINDINGS IN SERUM: ICD-10-CM

## 2025-02-13 DIAGNOSIS — R52 PAIN, UNSPECIFIED: ICD-10-CM

## 2025-02-13 DIAGNOSIS — Z98.84 BARIATRIC SURGERY STATUS: ICD-10-CM

## 2025-02-13 PROCEDURE — 99215 OFFICE O/P EST HI 40 MIN: CPT | Mod: 25

## 2025-02-13 PROCEDURE — 20611 DRAIN/INJ JOINT/BURSA W/US: CPT | Mod: RT

## 2025-02-26 ENCOUNTER — APPOINTMENT (OUTPATIENT)
Dept: RHEUMATOLOGY | Facility: CLINIC | Age: 55
End: 2025-02-26
Payer: COMMERCIAL

## 2025-02-26 ENCOUNTER — APPOINTMENT (OUTPATIENT)
Dept: RADIOLOGY | Facility: CLINIC | Age: 55
End: 2025-02-26
Payer: COMMERCIAL

## 2025-02-26 ENCOUNTER — OUTPATIENT (OUTPATIENT)
Dept: OUTPATIENT SERVICES | Facility: HOSPITAL | Age: 55
LOS: 1 days | End: 2025-02-26

## 2025-02-26 VITALS
BODY MASS INDEX: 30.99 KG/M2 | OXYGEN SATURATION: 98 % | HEART RATE: 59 BPM | HEIGHT: 65 IN | TEMPERATURE: 98.1 F | DIASTOLIC BLOOD PRESSURE: 74 MMHG | WEIGHT: 186 LBS | SYSTOLIC BLOOD PRESSURE: 103 MMHG

## 2025-02-26 DIAGNOSIS — M79.641 PAIN IN RIGHT HAND: ICD-10-CM

## 2025-02-26 DIAGNOSIS — M79.642 PAIN IN RIGHT HAND: ICD-10-CM

## 2025-02-26 DIAGNOSIS — F41.9 ANXIETY DISORDER, UNSPECIFIED: ICD-10-CM

## 2025-02-26 DIAGNOSIS — R76.8 OTHER SPECIFIED ABNORMAL IMMUNOLOGICAL FINDINGS IN SERUM: ICD-10-CM

## 2025-02-26 PROCEDURE — 99204 OFFICE O/P NEW MOD 45 MIN: CPT

## 2025-02-26 PROCEDURE — G2211 COMPLEX E/M VISIT ADD ON: CPT

## 2025-02-26 PROCEDURE — 73130 X-RAY EXAM OF HAND: CPT | Mod: 26,LT,RT

## 2025-02-26 RX ORDER — HYDROXYZINE HYDROCHLORIDE 25 MG/1
25 TABLET ORAL
Qty: 30 | Refills: 3 | Status: ACTIVE | COMMUNITY
Start: 2025-02-26

## 2025-03-05 LAB
ANTI-BETA2 GLYCOPROTEIN 1 IGG CONCENTRATION (ELFA): 1.6 U/ML
ANTI-BETA2 GLYCOPROTEIN 1 IGM CONCENTRATION (ELFA): 23 U/ML
ANTI-CARDIOLIPIN IGG CONCENTRATION (ELFA): 2 GPL
ANTI-CARDIOLIPIN IGM CONCENTRATION (ELFA): 31 MPL
ANTI-CENP IGG CONCENTRATION (ELFA): 0.4 U/ML
ANTI-CYCLIC CITRULLINATED PEPTIDE IGG CONCENTRATION (ELFA): 1.1 U/ML
ANTI-DOUBLE-STRANDED DNA IGG CONCENTRATION (ELISA): 23.02 IU/ML
ANTI-JO-1 IGG CONCENTRATION (ELFA): 0.3 U/ML
ANTI-NUCLEAR ANTIBODIES - CYTOPLASMIC PATTERN: NORMAL
ANTI-NUCLEAR ANTIBODIES - PRIMARY NUCLEAR PATTERN: NORMAL
ANTI-NUCLEAR ANTIBODIES - PRIMARY PATTERN TITER (IFA): NEGATIVE
ANTI-NUCLEAR ANTIBODIES IGG CONCENTRATION (ELISA): 22.68 UNITS
ANTI-RA33 IGA CONCENTRATION (ELFA): 5.7 U/ML
ANTI-RA33 IGG CONCENTRATION (ELFA): 1.8 U/ML
ANTI-RA33 IGM CONCENTRATION (ELFA): 31 U/ML
ANTI-RNA POL III IGG CONCENTRATION (ELFA): 1.2 U/ML
ANTI-RNP70 IGG CONCENTRATION (ELFA): 2.6 U/ML
ANTI-RO52 IGG CONCENTRATION (ELFA): 1 U/ML
ANTI-RO60 IGG CONCENTRATION (ELFA): 0.4 U/ML
ANTI-SCL-70 IGG CONCENTRATION (ELFA): 0.8 U/ML
ANTI-SMITH IGG CONCENTRATION (ELFA): 0.8 U/ML
ANTI-SS-B (LA) IGG CONCENTRATION (ELFA): 0.7 U/ML
ANTI-THYROGLOBULIN IGG CONCENTRATION (ELFA): 13 IU/ML
ANTI-THYROID PEROXIDASE IGG CONCENTRATION (ELFA): <4 IU/ML
ANTI-U1RNP IGG CONCENTRATION (ELFA): 1.7 U/ML
AVISE LUPUS INDEX: -1
AVISE LUPUS RESULT: NEGATIVE
B-LYMPHOCYTE-BOUND C4D (BC4D) LEVEL (FC): 11
ERYTHROCYTE-BOUND C4D (EC4D) LEVEL (FC): 4
RHEUMATOID FACTOR (IGA) CONCENTRATION (ELFA): 5.7 IU/ML
RHEUMATOID FACTOR (IGM) CONCENTRATION (ELFA): 1.8 IU/ML
T CELL RESULT: NORMAL
TC4D (FC): 180
TIGG (FC): <138
TIGM (FC): <187

## 2025-03-13 ENCOUNTER — APPOINTMENT (OUTPATIENT)
Dept: PHYSICAL MEDICINE AND REHAB | Facility: CLINIC | Age: 55
End: 2025-03-13

## 2025-03-13 VITALS
HEIGHT: 65 IN | BODY MASS INDEX: 30.99 KG/M2 | HEART RATE: 69 BPM | SYSTOLIC BLOOD PRESSURE: 114 MMHG | WEIGHT: 186 LBS | OXYGEN SATURATION: 99 % | DIASTOLIC BLOOD PRESSURE: 79 MMHG

## 2025-03-13 DIAGNOSIS — M54.12 RADICULOPATHY, CERVICAL REGION: ICD-10-CM

## 2025-03-13 DIAGNOSIS — M75.51 BURSITIS OF RIGHT SHOULDER: ICD-10-CM

## 2025-03-13 DIAGNOSIS — Z98.84 BARIATRIC SURGERY STATUS: ICD-10-CM

## 2025-03-13 DIAGNOSIS — M19.011 PRIMARY OSTEOARTHRITIS, RIGHT SHOULDER: ICD-10-CM

## 2025-03-13 DIAGNOSIS — G25.89 OTHER SPECIFIED EXTRAPYRAMIDAL AND MOVEMENT DISORDERS: ICD-10-CM

## 2025-03-13 DIAGNOSIS — M67.912 UNSPECIFIED DISORDER OF SYNOVIUM AND TENDON, LEFT SHOULDER: ICD-10-CM

## 2025-03-13 DIAGNOSIS — R52 PAIN, UNSPECIFIED: ICD-10-CM

## 2025-03-13 PROCEDURE — G2211 COMPLEX E/M VISIT ADD ON: CPT

## 2025-03-13 PROCEDURE — 99214 OFFICE O/P EST MOD 30 MIN: CPT

## 2025-03-13 RX ORDER — GABAPENTIN 100 MG/1
100 CAPSULE ORAL
Qty: 60 | Refills: 2 | Status: ACTIVE | COMMUNITY
Start: 2025-03-13 | End: 1900-01-01

## 2025-05-28 ENCOUNTER — APPOINTMENT (OUTPATIENT)
Dept: RHEUMATOLOGY | Facility: CLINIC | Age: 55
End: 2025-05-28
Payer: COMMERCIAL

## 2025-05-28 VITALS
DIASTOLIC BLOOD PRESSURE: 75 MMHG | HEIGHT: 65 IN | TEMPERATURE: 97.8 F | BODY MASS INDEX: 31.82 KG/M2 | HEART RATE: 82 BPM | SYSTOLIC BLOOD PRESSURE: 108 MMHG | OXYGEN SATURATION: 97 % | WEIGHT: 191 LBS

## 2025-05-28 DIAGNOSIS — M79.641 PAIN IN RIGHT HAND: ICD-10-CM

## 2025-05-28 DIAGNOSIS — M79.642 PAIN IN RIGHT HAND: ICD-10-CM

## 2025-05-28 DIAGNOSIS — M54.16 RADICULOPATHY, LUMBAR REGION: ICD-10-CM

## 2025-05-28 DIAGNOSIS — M19.011 PRIMARY OSTEOARTHRITIS, RIGHT SHOULDER: ICD-10-CM

## 2025-05-28 PROCEDURE — G2211 COMPLEX E/M VISIT ADD ON: CPT | Mod: NC

## 2025-05-28 PROCEDURE — 99214 OFFICE O/P EST MOD 30 MIN: CPT

## 2025-06-19 ENCOUNTER — APPOINTMENT (OUTPATIENT)
Dept: PHYSICAL MEDICINE AND REHAB | Facility: CLINIC | Age: 55
End: 2025-06-19